# Patient Record
Sex: FEMALE | Race: WHITE | Employment: FULL TIME | URBAN - NONMETROPOLITAN AREA
[De-identification: names, ages, dates, MRNs, and addresses within clinical notes are randomized per-mention and may not be internally consistent; named-entity substitution may affect disease eponyms.]

---

## 2022-06-22 ENCOUNTER — HOSPITAL ENCOUNTER (EMERGENCY)
Age: 62
Discharge: HOME | End: 2022-06-22
Attending: NURSE PRACTITIONER
Payer: COMMERCIAL

## 2022-06-22 VITALS
DIASTOLIC BLOOD PRESSURE: 63 MMHG | HEART RATE: 72 BPM | RESPIRATION RATE: 16 BRPM | SYSTOLIC BLOOD PRESSURE: 105 MMHG | OXYGEN SATURATION: 98 % | WEIGHT: 160 LBS | HEIGHT: 63 IN | BODY MASS INDEX: 28.35 KG/M2 | TEMPERATURE: 98.1 F

## 2022-06-22 DIAGNOSIS — S91.332A PUNCTURE WOUND OF LEFT FOOT, INITIAL ENCOUNTER: Primary | ICD-10-CM

## 2022-06-22 PROCEDURE — 90471 IMMUNIZATION ADMIN: CPT | Performed by: NURSE PRACTITIONER

## 2022-06-22 PROCEDURE — 6360000002 HC RX W HCPCS: Performed by: NURSE PRACTITIONER

## 2022-06-22 PROCEDURE — 99203 OFFICE O/P NEW LOW 30 MIN: CPT

## 2022-06-22 PROCEDURE — 90715 TDAP VACCINE 7 YRS/> IM: CPT | Performed by: NURSE PRACTITIONER

## 2022-06-22 RX ORDER — CEPHALEXIN 500 MG/1
500 CAPSULE ORAL 3 TIMES DAILY
Qty: 15 CAPSULE | Refills: 0 | Status: SHIPPED | OUTPATIENT
Start: 2022-06-22

## 2022-06-22 RX ADMIN — TETANUS TOXOID, REDUCED DIPHTHERIA TOXOID AND ACELLULAR PERTUSSIS VACCINE, ADSORBED 0.5 ML: 5; 2.5; 8; 8; 2.5 SUSPENSION INTRAMUSCULAR at 20:16

## 2022-06-22 ASSESSMENT — PAIN - FUNCTIONAL ASSESSMENT: PAIN_FUNCTIONAL_ASSESSMENT: NONE - DENIES PAIN

## 2022-06-23 ASSESSMENT — ENCOUNTER SYMPTOMS
GASTROINTESTINAL NEGATIVE: 1
EYES NEGATIVE: 1
RESPIRATORY NEGATIVE: 1

## 2022-06-23 NOTE — ED PROVIDER NOTES
[id number]       Chief Complaint   Patient presents with    Puncture Wound     left ffot- stepped on a nail at [employer] game today  went thropugh flip flop shoe       Nurses Notes reviewed and I agree except as noted in the HPI. HISTORY OF PRESENT ILLNESS   Ingrid Simpson is a 65 y.o. female who presents for evaluation of a puncture wound to the heel of the left foot. She was walking in her flip flops at a ball field and stepped on a anastasia metal object. She pulled the object out of her skin rather easily. She was able to walk without difficulty. REVIEW OF SYSTEMS     Review of Systems   Constitutional: Negative. HENT: Negative. Eyes: Negative. Respiratory: Negative. Cardiovascular: Negative. Gastrointestinal: Negative. Genitourinary: Negative. Musculoskeletal: Negative. Skin: Positive for wound. PAST MEDICAL HISTORY         Diagnosis Date    Atrial fibrillation (HCC)     Iron (Fe) deficiency anemia     Mitral valve disorder        SURGICAL HISTORY     Patient  has a past surgical history that includes Endometrial ablation;  section; Kale-en-Y Gastric Bypass; and other surgical history. CURRENT MEDICATIONS       Discharge Medication List as of 2022  8:20 PM      CONTINUE these medications which have NOT CHANGED    Details   metoprolol (LOPRESSOR) 25 MG tablet Take 25 mg by mouth 2 times daily      diltiazem (CARDIZEM) 30 MG tablet Take 30 mg by mouth 2 times daily      ferrous sulfate 325 (65 FE) MG EC tablet Take 65 mg by mouth 2 times daily      aspirin 325 MG tablet Take 81 mg by mouth Indications: takes twice week Historical Med      vitamin B-12 (CYANOCOBALAMIN) 500 MCG tablet Take 250 mcg by mouth daily      azithromycin (ZITHROMAX Z-VINICIO) 250 MG tablet 2 tablets day 1 then1 tablet days 2 - 5., Disp-6 tablet, R-0Normal      Multiple Vitamin (MULTIVITAMINS PO) Take  by mouth. diphenhydrAMINE (BENADRYL) 2 % cream Apply  topically 3 times daily as needed. Apply topically 3 times daily as needed. , Topical, Historical Med             ALLERGIES     Patient is is allergic to penicillins. FAMILY HISTORY     Patient'sfamily history includes Heart Disease in her father; Other (age of onset: 80) in her mother. SOCIAL HISTORY     Patient  reports that she has never smoked. She has never used smokeless tobacco. She reports previous alcohol use. She reports that she does not use drugs. PHYSICAL EXAM     ED TRIAGE VITALS  BP: 105/63, Temp: 98.1 °F (36.7 °C), Heart Rate: 72, Resp: 16, SpO2: 98 %  Physical Exam  Vitals reviewed. Constitutional:       General: She is not in acute distress. Appearance: Normal appearance. She is normal weight. She is not ill-appearing. HENT:      Head: Normocephalic and atraumatic. Nose: Nose normal.   Cardiovascular:      Rate and Rhythm: Normal rate. Pulmonary:      Effort: Pulmonary effort is normal.   Musculoskeletal:      Cervical back: Normal range of motion. Comments: Left foot heel small superficial puncture wound, no active bleeding or surrounding erythema. No TTP. ROM of the foot intact. DP 2+   Neurological:      Mental Status: She is alert. DIAGNOSTIC RESULTS   Labs: No results found for this visit on 06/22/22. IMAGING:  No orders to display     URGENT CARE COURSE:     Vitals:    06/22/22 2005 06/22/22 2030   BP: (!) 142/65 105/63   Pulse: 72 72   Resp: 14 16   Temp: 98.1 °F (36.7 °C)    SpO2: 98%    Weight: 160 lb (72.6 kg)    Height: 5' 3\" (1.6 m)        Medications   Tetanus-Diphth-Acell Pertussis (BOOSTRIX) injection 0.5 mL (0.5 mLs IntraMUSCular Given 6/22/22 2016)     PROCEDURES:  None  FINALIMPRESSION      1. Puncture wound of left foot, initial encounter        DISPOSITION/PLAN   DISPOSITION Decision To Discharge 06/22/2022 08:14:38 PM  Tetanus updated. Keflex sent to pharmacy.    Wound care soap and water,

## 2023-01-24 ENCOUNTER — NURSE ONLY (OUTPATIENT)
Dept: LAB | Age: 63
End: 2023-01-24

## 2023-01-31 LAB — CYTOLOGY THIN PREP PAP: NORMAL
